# Patient Record
Sex: FEMALE | Race: WHITE | NOT HISPANIC OR LATINO | Employment: FULL TIME | URBAN - METROPOLITAN AREA
[De-identification: names, ages, dates, MRNs, and addresses within clinical notes are randomized per-mention and may not be internally consistent; named-entity substitution may affect disease eponyms.]

---

## 2022-06-23 ENCOUNTER — HOSPITAL ENCOUNTER (EMERGENCY)
Facility: CLINIC | Age: 37
Discharge: HOME | End: 2022-06-23
Attending: EMERGENCY MEDICINE | Admitting: EMERGENCY MEDICINE
Payer: COMMERCIAL

## 2022-06-23 ENCOUNTER — APPOINTMENT (OUTPATIENT)
Dept: CT IMAGING | Facility: CLINIC | Age: 37
End: 2022-06-23
Attending: EMERGENCY MEDICINE
Payer: COMMERCIAL

## 2022-06-23 VITALS
TEMPERATURE: 98.8 F | SYSTOLIC BLOOD PRESSURE: 125 MMHG | DIASTOLIC BLOOD PRESSURE: 66 MMHG | OXYGEN SATURATION: 100 % | RESPIRATION RATE: 10 BRPM | BODY MASS INDEX: 32.18 KG/M2 | HEART RATE: 116 BPM | HEIGHT: 67 IN | WEIGHT: 205 LBS

## 2022-06-23 DIAGNOSIS — K52.9 NON-SPECIFIC COLITIS: ICD-10-CM

## 2022-06-23 LAB
ALBUMIN SERPL-MCNC: 3.6 G/DL (ref 3.4–5)
ALP SERPL-CCNC: 66 U/L (ref 40–150)
ALT SERPL W P-5'-P-CCNC: 18 U/L (ref 0–50)
ANION GAP SERPL CALCULATED.3IONS-SCNC: 9 MMOL/L (ref 3–14)
AST SERPL W P-5'-P-CCNC: 12 U/L (ref 0–45)
BASOPHILS # BLD AUTO: 0 10E3/UL (ref 0–0.2)
BASOPHILS NFR BLD AUTO: 0 %
BILIRUB SERPL-MCNC: 0.9 MG/DL (ref 0.2–1.3)
BUN SERPL-MCNC: 10 MG/DL (ref 7–30)
CALCIUM SERPL-MCNC: 9.1 MG/DL (ref 8.5–10.1)
CHLORIDE BLD-SCNC: 107 MMOL/L (ref 94–109)
CO2 SERPL-SCNC: 23 MMOL/L (ref 20–32)
CREAT SERPL-MCNC: 0.79 MG/DL (ref 0.52–1.04)
EOSINOPHIL # BLD AUTO: 0.1 10E3/UL (ref 0–0.7)
EOSINOPHIL NFR BLD AUTO: 0 %
ERYTHROCYTE [DISTWIDTH] IN BLOOD BY AUTOMATED COUNT: 12.2 % (ref 10–15)
GFR SERPL CREATININE-BSD FRML MDRD: >90 ML/MIN/1.73M2
GLUCOSE BLD-MCNC: 128 MG/DL (ref 70–99)
HCO3 BLDV-SCNC: 23 MMOL/L (ref 21–28)
HCT VFR BLD AUTO: 41 % (ref 35–47)
HGB BLD-MCNC: 13.4 G/DL (ref 11.7–15.7)
HOLD SPECIMEN: NORMAL
HOLD SPECIMEN: NORMAL
IMM GRANULOCYTES # BLD: 0 10E3/UL
IMM GRANULOCYTES NFR BLD: 0 %
LACTATE BLD-SCNC: 1.9 MMOL/L
LYMPHOCYTES # BLD AUTO: 1.5 10E3/UL (ref 0.8–5.3)
LYMPHOCYTES NFR BLD AUTO: 10 %
MCH RBC QN AUTO: 29.9 PG (ref 26.5–33)
MCHC RBC AUTO-ENTMCNC: 32.7 G/DL (ref 31.5–36.5)
MCV RBC AUTO: 92 FL (ref 78–100)
MONOCYTES # BLD AUTO: 0.8 10E3/UL (ref 0–1.3)
MONOCYTES NFR BLD AUTO: 5 %
NEUTROPHILS # BLD AUTO: 12.2 10E3/UL (ref 1.6–8.3)
NEUTROPHILS NFR BLD AUTO: 85 %
NRBC # BLD AUTO: 0 10E3/UL
NRBC BLD AUTO-RTO: 0 /100
PCO2 BLDV: 35 MM HG (ref 40–50)
PH BLDV: 7.42 [PH] (ref 7.32–7.43)
PLATELET # BLD AUTO: 277 10E3/UL (ref 150–450)
PO2 BLDV: <15 MM HG (ref 25–47)
POTASSIUM BLD-SCNC: 3.7 MMOL/L (ref 3.4–5.3)
PROT SERPL-MCNC: 7.9 G/DL (ref 6.8–8.8)
RBC # BLD AUTO: 4.48 10E6/UL (ref 3.8–5.2)
SAO2 % BLDV: ABNORMAL %
SODIUM SERPL-SCNC: 139 MMOL/L (ref 133–144)
WBC # BLD AUTO: 14.6 10E3/UL (ref 4–11)

## 2022-06-23 PROCEDURE — 36415 COLL VENOUS BLD VENIPUNCTURE: CPT | Performed by: EMERGENCY MEDICINE

## 2022-06-23 PROCEDURE — 85025 COMPLETE CBC W/AUTO DIFF WBC: CPT | Performed by: EMERGENCY MEDICINE

## 2022-06-23 PROCEDURE — 96374 THER/PROPH/DIAG INJ IV PUSH: CPT | Mod: 59

## 2022-06-23 PROCEDURE — 250N000011 HC RX IP 250 OP 636: Performed by: EMERGENCY MEDICINE

## 2022-06-23 PROCEDURE — 83605 ASSAY OF LACTIC ACID: CPT

## 2022-06-23 PROCEDURE — 96375 TX/PRO/DX INJ NEW DRUG ADDON: CPT

## 2022-06-23 PROCEDURE — 82803 BLOOD GASES ANY COMBINATION: CPT

## 2022-06-23 PROCEDURE — 250N000009 HC RX 250: Performed by: EMERGENCY MEDICINE

## 2022-06-23 PROCEDURE — 250N000013 HC RX MED GY IP 250 OP 250 PS 637: Performed by: EMERGENCY MEDICINE

## 2022-06-23 PROCEDURE — 258N000003 HC RX IP 258 OP 636: Performed by: EMERGENCY MEDICINE

## 2022-06-23 PROCEDURE — 99285 EMERGENCY DEPT VISIT HI MDM: CPT | Mod: 25

## 2022-06-23 PROCEDURE — 80053 COMPREHEN METABOLIC PANEL: CPT | Performed by: EMERGENCY MEDICINE

## 2022-06-23 PROCEDURE — 96361 HYDRATE IV INFUSION ADD-ON: CPT

## 2022-06-23 PROCEDURE — 74177 CT ABD & PELVIS W/CONTRAST: CPT

## 2022-06-23 RX ORDER — ONDANSETRON 4 MG/1
4 TABLET, ORALLY DISINTEGRATING ORAL EVERY 8 HOURS PRN
Qty: 10 TABLET | Refills: 0 | Status: SHIPPED | OUTPATIENT
Start: 2022-06-23 | End: 2022-06-26

## 2022-06-23 RX ORDER — CIPROFLOXACIN 500 MG/1
500 TABLET, FILM COATED ORAL 2 TIMES DAILY
Qty: 20 TABLET | Refills: 0 | Status: SHIPPED | OUTPATIENT
Start: 2022-06-23 | End: 2022-07-03

## 2022-06-23 RX ORDER — IOPAMIDOL 755 MG/ML
103 INJECTION, SOLUTION INTRAVASCULAR ONCE
Status: COMPLETED | OUTPATIENT
Start: 2022-06-23 | End: 2022-06-23

## 2022-06-23 RX ORDER — SODIUM CHLORIDE 9 MG/ML
INJECTION, SOLUTION INTRAVENOUS CONTINUOUS
Status: DISCONTINUED | OUTPATIENT
Start: 2022-06-23 | End: 2022-06-23 | Stop reason: HOSPADM

## 2022-06-23 RX ORDER — HYDROCODONE BITARTRATE AND ACETAMINOPHEN 5; 325 MG/1; MG/1
1-2 TABLET ORAL EVERY 4 HOURS PRN
Qty: 12 TABLET | Refills: 0 | Status: SHIPPED | OUTPATIENT
Start: 2022-06-23

## 2022-06-23 RX ORDER — METRONIDAZOLE 500 MG/1
500 TABLET ORAL 2 TIMES DAILY
Qty: 20 TABLET | Refills: 0 | Status: SHIPPED | OUTPATIENT
Start: 2022-06-23 | End: 2022-07-03

## 2022-06-23 RX ORDER — CIPROFLOXACIN 500 MG/1
500 TABLET, FILM COATED ORAL ONCE
Status: COMPLETED | OUTPATIENT
Start: 2022-06-23 | End: 2022-06-23

## 2022-06-23 RX ORDER — ONDANSETRON 2 MG/ML
4 INJECTION INTRAMUSCULAR; INTRAVENOUS EVERY 30 MIN PRN
Status: DISCONTINUED | OUTPATIENT
Start: 2022-06-23 | End: 2022-06-23 | Stop reason: HOSPADM

## 2022-06-23 RX ORDER — HYDROMORPHONE HYDROCHLORIDE 1 MG/ML
0.5 INJECTION, SOLUTION INTRAMUSCULAR; INTRAVENOUS; SUBCUTANEOUS
Status: DISCONTINUED | OUTPATIENT
Start: 2022-06-23 | End: 2022-06-23 | Stop reason: HOSPADM

## 2022-06-23 RX ORDER — METRONIDAZOLE 500 MG/1
500 TABLET ORAL ONCE
Status: COMPLETED | OUTPATIENT
Start: 2022-06-23 | End: 2022-06-23

## 2022-06-23 RX ADMIN — CIPROFLOXACIN HYDROCHLORIDE 500 MG: 500 TABLET, FILM COATED ORAL at 09:10

## 2022-06-23 RX ADMIN — HYDROMORPHONE HYDROCHLORIDE 0.5 MG: 1 INJECTION, SOLUTION INTRAMUSCULAR; INTRAVENOUS; SUBCUTANEOUS at 06:34

## 2022-06-23 RX ADMIN — ONDANSETRON 4 MG: 2 INJECTION INTRAMUSCULAR; INTRAVENOUS at 06:33

## 2022-06-23 RX ADMIN — METRONIDAZOLE 500 MG: 500 TABLET, FILM COATED ORAL at 09:10

## 2022-06-23 RX ADMIN — SODIUM CHLORIDE 69 ML: 900 INJECTION INTRAVENOUS at 07:11

## 2022-06-23 RX ADMIN — SODIUM CHLORIDE 1000 ML: 9 INJECTION, SOLUTION INTRAVENOUS at 06:36

## 2022-06-23 RX ADMIN — IOPAMIDOL 103 ML: 755 INJECTION, SOLUTION INTRAVENOUS at 07:11

## 2022-06-23 ASSESSMENT — ENCOUNTER SYMPTOMS
VOMITING: 0
BLOOD IN STOOL: 1
BACK PAIN: 1
ABDOMINAL PAIN: 1
DYSURIA: 0
CHILLS: 1
FEVER: 0
SHORTNESS OF BREATH: 0
NAUSEA: 1

## 2022-06-23 NOTE — ED TRIAGE NOTES
[facility]  ED Arrival Note    Arrived c/o lower abdominal and back pain, chills and generalized muscle ache.  Pt states that she had a colonoscopy yesterday through [facility].   She had a 5 mm polyp removed and Left and right biopsy for microscopic colitis. Pt states that she passed bloody stool yesterday which was normal but woke up this AM with chills and abdominal pain.    Visitors during triage: Spouse      Triage Interventions: N/A  Ambulatory: Yes    Directed to: Main ED       Triage Assessment     Row Name 06/23/22 0558       Triage Assessment (Adult)    Airway WDL WDL       Respiratory WDL    Respiratory WDL WDL       Cardiac WDL    Cardiac WDL WDL       Peripheral/Neurovascular WDL    Peripheral Neurovascular WDL WDL       Cognitive/Neuro/Behavioral WDL    Cognitive/Neuro/Behavioral WDL WDL

## 2022-06-23 NOTE — ED PROVIDER NOTES
"  History   Chief Complaint:  Chills and Abdominal Pain       HPI   Johana Rdz is a 37 year old female who presents with lower abdominal pain that is radiating into her back, thighs and shoulders accompanied by chills that started at 0430 this morning. Her pain and chills woke her up from sleep. Her pain is sharp and cramping bellow her belly button and dull and achy in her back. She had a colonoscopy yesterday at 1230 at [facility] in [place]. She had a 5 mm polyp removed and 2 biopsies were done and she was feeling okay afterward. She had 2 episodes of blood in her stool yesterday. She has not had a bowel movement today. She is nauseous but has not vomited. No history of abdominal surgery. It is not painful for her to breathe. Denies shortness of breath, fever or dysuria.    Review of Systems   Constitutional: Positive for chills. Negative for fever.   Respiratory: Negative for shortness of breath.    Gastrointestinal: Positive for abdominal pain, blood in stool and nausea. Negative for vomiting.   Genitourinary: Negative for dysuria.   Musculoskeletal: Positive for back pain.       Allergies:  Azithromycin  Erythromycin  Yareli [Drospirenone-Ethinyl Estradiol]  Latex  Sulfur    Medications:  Escitalopram    Past Medical History:     Migraines  Anxiety    Past Surgical History:    Colonoscopy    Social History:  The patient presents to the ED with her .    Physical Exam     Patient Vitals for the past 24 hrs:   BP Temp Temp src Pulse Resp SpO2 Height Weight   06/23/22 0900 125/66 -- -- 116 10 100 % -- --   06/23/22 0830 138/77 -- -- 112 13 99 % -- --   06/23/22 0700 -- -- -- 112 11 100 % -- --   06/23/22 0645 -- -- -- 108 19 100 % -- --   06/23/22 0630 (!) 132/93 -- -- 107 26 97 % -- --   06/23/22 0557 119/63 98.8  F (37.1  C) Oral 110 20 100 % 1.702 m (5' 7\") 93 kg (205 lb)       Physical Exam  Nursing note and vitals reviewed.  Constitutional:  Oriented to person, place, and time. " Cooperative.   HENT:   Nose:    Nose normal.   Mouth/Throat:   Facemask in place.   Eyes:    Conjunctivae normal and EOM are normal.      Pupils are equal, round, and reactive to light.   Neck:    Trachea normal.   Cardiovascular:  Tachycardic rate, regular rhythm, normal heart sounds and normal pulses. No murmur heard.  Pulmonary/Chest:  Effort normal and breath sounds normal.   Abdominal:   Soft with normal bowel sounds.  Diffuse tenderness to palpation with maximal tenderness in the lower abdomen.   Musculoskeletal:  Extremities atraumatic x 4.   Lymphadenopathy:  No cervical adenopathy.   Neurological:   Alert and oriented to person, place, and time. Normal strength.      No cranial nerve deficit or sensory deficit. GCS eye subscore is 4. GCS verbal subscore is 5. GCS motor subscore is 6.   Skin:    Skin is intact. No rash noted.   Psychiatric:   Somewhat anxious.      Emergency Department Course     Imaging:  CT Abdomen Pelvis w Contrast   Final Result   IMPRESSION:    1. Short segment of colonic wall thickening centered around the   splenic flexure, worrisome for short segment colitis.   2. No evidence of colonic perforation.      DOROTHY GARCIA MD            SYSTEM ID:  [id number]        Report per radiology    Laboratory:  Labs Ordered and Resulted from Time of ED Arrival to Time of ED Departure   COMPREHENSIVE METABOLIC PANEL - Abnormal       Result Value    Sodium 139      Potassium 3.7      Chloride 107      Carbon Dioxide (CO2) 23      Anion Gap 9      Urea Nitrogen 10      Creatinine 0.79      Calcium 9.1      Glucose 128 (*)     Alkaline Phosphatase 66      AST 12      ALT 18      Protein Total 7.9      Albumin 3.6      Bilirubin Total 0.9      GFR Estimate >90     CBC WITH PLATELETS AND DIFFERENTIAL - Abnormal    WBC Count 14.6 (*)     RBC Count 4.48      Hemoglobin 13.4      Hematocrit 41.0      MCV 92      MCH 29.9      MCHC 32.7      RDW 12.2      Platelet Count 277      % Neutrophils 85      %  Lymphocytes 10      % Monocytes 5      % Eosinophils 0      % Basophils 0      % Immature Granulocytes 0      NRBCs per 100 WBC 0      Absolute Neutrophils 12.2 (*)     Absolute Lymphocytes 1.5      Absolute Monocytes 0.8      Absolute Eosinophils 0.1      Absolute Basophils 0.0      Absolute Immature Granulocytes 0.0      Absolute NRBCs 0.0     ISTAT GASES LACTATE VENOUS POCT - Abnormal    Lactic Acid POCT 1.9      Bicarbonate Venous POCT 23      O2 Sat, Venous POCT        pCO2V Venous POCT 35 (*)     pH Venous POCT 7.42      pO2 Venous POCT <15 (*)         Emergency Department Course:      Reviewed:  I reviewed nursing notes, vitals and past medical history    Assessments:  0619 I obtained history and examined the patient as noted above.   0858 I rechecked the patient and explained findings.     Consults:  0847 I consulted with Dr. Vega, from [facility] regarding the patient.    Interventions:  0633 Zofran 4 mg IV  0634 Dilaudid 0.5 mg IV  0636 NS bolus 1L IV  0853 Cipro 500 mg PO  0853 Flagyl 500 mg PO    Disposition:  The patient was discharged to home.     Impression & Plan     CMS Diagnoses: None      Medical Decision Making:  This is a 37-year-old female who came in for further evaluation of abdominal pain and chills, in the setting of having a colonoscopy yesterday.  I was concerned that she could have a perforation or possibly colitis as a cause of her symptoms.  I proceeded with the above work-up including the blood work and CT scan.  She does have a small segment of likely colitis, but she does not have a perforation or any other abnormal findings on the CT scan.  I subsequently spoke with her GI physician, Dr. Vega, who felt it was reasonable to treat her with antibiotics and specifically Cipro and Flagyl.  The patient is feeling better at this point, therefore I feel that she is safe for discharge and outpatient management.  She was provided her first oral doses of Cipro and  Flagyl here, and I will send her home with prescriptions for both for 10 days.  I have also provided her prescriptions for Zofran and Norco.  She should follow-up with her primary physician and her GI physician if her symptoms persist and certainly return with any concerns or worsening symptoms.    Diagnosis:    ICD-10-CM    1. Non-specific colitis  K52.9        Discharge Medications:  New Prescriptions    CIPROFLOXACIN (CIPRO) 500 MG TABLET    Take 1 tablet (500 mg) by mouth 2 times daily for 10 days    HYDROCODONE-ACETAMINOPHEN (NORCO) 5-325 MG TABLET    Take 1-2 tablets by mouth every 4 hours as needed for pain    METRONIDAZOLE (FLAGYL) 500 MG TABLET    Take 1 tablet (500 mg) by mouth 2 times daily for 10 days    ONDANSETRON (ZOFRAN ODT) 4 MG ODT TAB    Take 1 tablet (4 mg) by mouth every 8 hours as needed for nausea       Scribe Disclosure:  Minor ALMONTEin Jose, am serving as a scribe at 6:05 AM on 6/23/2022 to document services personally performed by Iván Tom MD based on my observations and the provider's statements to me.              Iván Tom MD  06/23/22 1486

## 2022-06-23 NOTE — Clinical Note
Johana Rdz was seen and treated in our emergency department on 6/23/2022.  She may return to work on 06/23/2022.       If you have any questions or concerns, please don't hesitate to call.      Luan Johnson, MELVA